# Patient Record
Sex: FEMALE | Race: WHITE | URBAN - METROPOLITAN AREA
[De-identification: names, ages, dates, MRNs, and addresses within clinical notes are randomized per-mention and may not be internally consistent; named-entity substitution may affect disease eponyms.]

---
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
## 2023-08-02 ENCOUNTER — EMERGENCY (EMERGENCY)
Facility: HOSPITAL | Age: 23
LOS: 1 days | Discharge: LEFT BEFORE DISCHARGE | End: 2023-08-02
Payer: OTHER MISCELLANEOUS

## 2023-08-02 VITALS
RESPIRATION RATE: 20 BRPM | HEART RATE: 92 BPM | SYSTOLIC BLOOD PRESSURE: 146 MMHG | TEMPERATURE: 99 F | HEIGHT: 69 IN | DIASTOLIC BLOOD PRESSURE: 94 MMHG | WEIGHT: 248.68 LBS | OXYGEN SATURATION: 98 %

## 2023-08-02 PROCEDURE — L9991: CPT

## 2023-08-02 NOTE — ED ADULT TRIAGE NOTE - CHIEF COMPLAINT QUOTE
Patient presents to ED with c/o that yesterday when she was at work she twisted her left ankle and fell onto her right knee.  Today the right knee and left ankle have become more swollen.  Last Tylenol 1 tab yesterday with some relief.

## 2023-08-04 ENCOUNTER — EMERGENCY (EMERGENCY)
Facility: HOSPITAL | Age: 23
LOS: 1 days | Discharge: HOME | End: 2023-08-04
Attending: EMERGENCY MEDICINE
Payer: SELF-PAY

## 2023-08-04 VITALS
HEIGHT: 69 IN | WEIGHT: 249.78 LBS | RESPIRATION RATE: 18 BRPM | DIASTOLIC BLOOD PRESSURE: 97 MMHG | SYSTOLIC BLOOD PRESSURE: 147 MMHG | TEMPERATURE: 98 F | HEART RATE: 91 BPM | OXYGEN SATURATION: 99 %

## 2023-08-04 PROCEDURE — 99284 EMERGENCY DEPT VISIT MOD MDM: CPT

## 2023-08-04 PROCEDURE — 73562 X-RAY EXAM OF KNEE 3: CPT | Mod: 26,RT

## 2023-08-04 PROCEDURE — 73610 X-RAY EXAM OF ANKLE: CPT

## 2023-08-04 PROCEDURE — 73562 X-RAY EXAM OF KNEE 3: CPT

## 2023-08-04 PROCEDURE — 73610 X-RAY EXAM OF ANKLE: CPT | Mod: 26,LT

## 2023-08-04 RX ORDER — IBUPROFEN 200 MG
1 TABLET ORAL
Qty: 28 | Refills: 0
Start: 2023-08-04 | End: 2023-08-10

## 2023-08-04 RX ORDER — IBUPROFEN 200 MG
600 TABLET ORAL ONCE
Refills: 0 | Status: COMPLETED | OUTPATIENT
Start: 2023-08-04 | End: 2023-08-04

## 2023-08-04 RX ADMIN — Medication 600 MILLIGRAM(S): at 12:49

## 2023-08-04 NOTE — ED ADULT TRIAGE NOTE - CHIEF COMPLAINT QUOTE
pt fell a couple of days ago, twisted left ankle and left knee. swelling to the left ankle and knee today.

## 2023-08-04 NOTE — ED PROVIDER NOTE - PHYSICAL EXAMINATION
Left ankle: no gross deformity of extremity, FROM knee/ hip, no fibula head/ prox fibula tenderness, (-) tenderness to base 5th MT, (-) tenderness to posterior aspect of lateral malleolus, no medial malleolus tenderness, (+) tenderness to ATFL, negative drawer test.     right knee: There is slight swelling noted, skin is intact, there are no deformities, patient is able to straight leg raise, ligaments are stable, there is no bony tenderness, there is full range of motion.

## 2023-08-04 NOTE — ED PROVIDER NOTE - PATIENT PORTAL LINK FT
You can access the FollowMyHealth Patient Portal offered by [facility] by registering at the following website: [email or web address]. By joining [facility]’s FollowMyHealth portal, you will also be able to view your health information using other applications (apps) compatible with our system.

## 2023-08-04 NOTE — ED PROVIDER NOTE - CARE PROVIDER_API CALL
Patricia Lopez  Orthopaedic Surgery  [address] [place] [address]  Phone: [phone]  Fax: [phone]  Follow Up Time:

## 2023-08-04 NOTE — ED PROVIDER NOTE - ATTENDING APP SHARED VISIT CONTRIBUTION OF CARE
left ankle sprain; neurovasc intact; imaging negative; agree with acp plan of care    This was a shared visit with YANELY. I reviewed and verified the documentation and independently performed the documented history/exam/mdm.

## 2023-08-04 NOTE — ED ADULT NURSE NOTE - OBJECTIVE STATEMENT
Pt s/p fall a few days ago and now has left ankle and right knee pain. No obvious deformity to either.

## 2023-08-04 NOTE — ED PROVIDER NOTE - CLINICAL SUMMARY MEDICAL DECISION MAKING FREE TEXT BOX
23-year-old female presents emergency department complaint of left ankle and right knee pain after fall several days ago.  X-rays were obtained which showed no acute fracture.  Will treat with anti-inflammatories and orthopedic follow-up.  ED return precautions discussed

## 2024-05-30 ENCOUNTER — APPOINTMENT (OUTPATIENT)
Dept: OBGYN | Facility: CLINIC | Age: 24
End: 2024-05-30
Payer: MEDICAID

## 2024-05-30 ENCOUNTER — NON-APPOINTMENT (OUTPATIENT)
Age: 24
End: 2024-05-30

## 2024-05-30 VITALS
SYSTOLIC BLOOD PRESSURE: 112 MMHG | WEIGHT: 247.38 LBS | DIASTOLIC BLOOD PRESSURE: 72 MMHG | BODY MASS INDEX: 37.49 KG/M2 | HEIGHT: 68 IN

## 2024-05-30 DIAGNOSIS — Z78.9 OTHER SPECIFIED HEALTH STATUS: ICD-10-CM

## 2024-05-30 DIAGNOSIS — Z83.3 FAMILY HISTORY OF DIABETES MELLITUS: ICD-10-CM

## 2024-05-30 DIAGNOSIS — Z87.09 PERSONAL HISTORY OF OTHER DISEASES OF THE RESPIRATORY SYSTEM: ICD-10-CM

## 2024-05-30 LAB
HCG UR QL: NEGATIVE
QUALITY CONTROL: YES

## 2024-05-30 PROCEDURE — 81025 URINE PREGNANCY TEST: CPT

## 2024-05-30 PROCEDURE — 99202 OFFICE O/P NEW SF 15 MIN: CPT

## 2024-05-30 NOTE — HISTORY OF PRESENT ILLNESS
[N] : Patient denies prior pregnancies [No] : Patient does not have concerns regarding sex [Never active] : never active [FreeTextEntry1] : Heydi is a 24 y.o. G0 female LMP 5/15/24 here as a new pt. for irregular menses.   She had menarche age 15, notes she has not had a predictable cycle since that time. Sometimes skipping more than one month at a time, at one point no menses for 4 months. Alternately, she will sometimes bleed for greater than one week, and have another bleed within two weeks. Cramping associated with all bleeding. Also endorses difficulty losing weight, states frequent exercise. 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 Denies intermenstrual spotting. Hx asthma, denies daily Rx medications.   Not sexually active to date, unsure of Gardasil status.   We reviewed multiple etiologies of irregular menses, will investigate with labs and pelvic US. Also discussed full physical preventive exam with first pap, she is amenable to exam at return apt.   Denies family hx breast, ovarian, endometrial, colorectal CA. Mom and dad with hx DM.  [Mammogramdate] : n/a [BreastSonogramDate] : n/a [PapSmeardate] : n/a [ColonoscopyDate] : n/a [GonorrheaDate] : 11/06/2020 [HPVDate] : n/a [LMPDate] : 05/15/2024 [PGHxTotal] : 0

## 2024-05-31 LAB
DHEA-S SERPL-MCNC: 308 UG/DL
ESTIMATED AVERAGE GLUCOSE: 114 MG/DL
ESTRADIOL SERPL-MCNC: 37 PG/ML
FSH SERPL-MCNC: 5.5 IU/L
HBA1C MFR BLD HPLC: 5.6 %
PROLACTIN SERPL-MCNC: 8.2 NG/ML
TESTOST SERPL-MCNC: 48.9 NG/DL
TSH SERPL-ACNC: 2.17 UIU/ML

## 2024-06-06 LAB — 17OHP SERPL-MCNC: 61 NG/DL

## 2024-06-07 ENCOUNTER — ASOB RESULT (OUTPATIENT)
Age: 24
End: 2024-06-07

## 2024-06-07 ENCOUNTER — APPOINTMENT (OUTPATIENT)
Dept: OBGYN | Facility: CLINIC | Age: 24
End: 2024-06-07
Payer: MEDICAID

## 2024-06-07 ENCOUNTER — APPOINTMENT (OUTPATIENT)
Dept: ANTEPARTUM | Facility: CLINIC | Age: 24
End: 2024-06-07
Payer: MEDICAID

## 2024-06-07 VITALS
WEIGHT: 247 LBS | BODY MASS INDEX: 37.44 KG/M2 | SYSTOLIC BLOOD PRESSURE: 118 MMHG | HEIGHT: 68 IN | DIASTOLIC BLOOD PRESSURE: 78 MMHG

## 2024-06-07 DIAGNOSIS — Z12.4 ENCOUNTER FOR SCREENING FOR MALIGNANT NEOPLASM OF CERVIX: ICD-10-CM

## 2024-06-07 DIAGNOSIS — N92.6 IRREGULAR MENSTRUATION, UNSPECIFIED: ICD-10-CM

## 2024-06-07 DIAGNOSIS — Z01.419 ENCOUNTER FOR GYNECOLOGICAL EXAMINATION (GENERAL) (ROUTINE) W/OUT ABNORMAL FINDINGS: ICD-10-CM

## 2024-06-07 DIAGNOSIS — E28.2 POLYCYSTIC OVARIAN SYNDROME: ICD-10-CM

## 2024-06-07 LAB
INSULIN FREE SERPL-MCNC: 49 UU/ML
INSULIN: 49 UU/ML

## 2024-06-07 PROCEDURE — 76857 US EXAM PELVIC LIMITED: CPT | Mod: 59

## 2024-06-07 PROCEDURE — 99395 PREV VISIT EST AGE 18-39: CPT

## 2024-06-07 PROCEDURE — 76830 TRANSVAGINAL US NON-OB: CPT

## 2024-06-07 PROCEDURE — 99459 PELVIC EXAMINATION: CPT

## 2024-06-07 RX ORDER — METFORMIN HYDROCHLORIDE 500 MG/1
500 TABLET, COATED ORAL DAILY
Qty: 60 | Refills: 0 | Status: ACTIVE | COMMUNITY
Start: 2024-06-07 | End: 1900-01-01

## 2024-06-07 NOTE — PHYSICAL EXAM
[Chaperone Present] : A chaperone was present in the examining room during all aspects of the physical examination [[id number]] : A chaperone was present during the pelvic exam. [Appropriately responsive] : appropriately responsive [Alert] : alert [No Acute Distress] : no acute distress [Soft] : soft [Non-tender] : non-tender [Non-distended] : non-distended [No Mass] : no mass [Oriented x3] : oriented x3 [Examination Of The Breasts] : a normal appearance [No Masses] : no breast masses were palpable [Labia Majora] : normal [Labia Minora] : normal [No Bleeding] : There was no active vaginal bleeding [Normal] : normal [Tenderness] : nontender [Uterine Adnexae] : non-palpable [FreeTextEntry5] : limited visualization/ pelvic exam secondary to pt. discomfort, body habitus

## 2024-06-07 NOTE — PLAN
[FreeTextEntry1] : -F/u pap -Rx Metformin today, discussed increasing dose PRN and possibly endocrine f/u if no improvement in weight management with continued exercise, diet modification  -Recommended dermatology for routine, annual skin survey -Return if she desires Gardasil vaccination prior to sexual activity  -Call or RTO PRN for any new problems, questions or concerns

## 2024-06-07 NOTE — HISTORY OF PRESENT ILLNESS
[N] : Patient denies prior pregnancies [No] : Patient does not have concerns regarding sex [Never active] : never active [Men] : men [GonorrheaDate] : 11/06/20 [TextBox_63] : NEG [ChlamydiaDate] : 11/06/20 [TextBox_68] : NEG [LMPDate] : 05/15/24 [PGHxTotal] : 0 [FreeTextEntry1] : 05/15/24

## 2024-06-13 LAB — CYTOLOGY CVX/VAG DOC THIN PREP: NORMAL

## 2024-06-21 NOTE — ED ADULT TRIAGE NOTE - NSWEIGHTCALCTOOLDRUG_GEN_A_CORE
Refill Routing Note   Medication(s) are not appropriate for processing by [facility] for the following reason(s):        Responsible provider unclear    [facility] action(s):  Route      Medication Therapy Plan: 6/18/24 ED VIST REASON: Screen for colon cancer      Appointments  past 12m or future 3m with PCP    Date Provider   Last Visit   5/8/2024 Ximena Ha NP   Next Visit   Visit date not found Ximena Ha NP   ED visits in past 90 days: 0        Note composed:11:20 AM 06/21/2024          
 used
Yes - the patient is able to be screened

## 2024-12-25 PROBLEM — F10.90 ALCOHOL USE: Status: ACTIVE | Noted: 2024-05-30

## 2025-07-24 ENCOUNTER — APPOINTMENT (OUTPATIENT)
Dept: OBGYN | Facility: CLINIC | Age: 25
End: 2025-07-24